# Patient Record
Sex: FEMALE
[De-identification: names, ages, dates, MRNs, and addresses within clinical notes are randomized per-mention and may not be internally consistent; named-entity substitution may affect disease eponyms.]

---

## 2017-04-06 ENCOUNTER — HOSPITAL ENCOUNTER (EMERGENCY)
Dept: HOSPITAL 14 - H.ER | Age: 62
Discharge: HOME | End: 2017-04-06
Payer: MEDICAID

## 2017-04-06 VITALS — DIASTOLIC BLOOD PRESSURE: 95 MMHG | RESPIRATION RATE: 18 BRPM | SYSTOLIC BLOOD PRESSURE: 132 MMHG | HEART RATE: 86 BPM

## 2017-04-06 VITALS — BODY MASS INDEX: 31 KG/M2

## 2017-04-06 VITALS — TEMPERATURE: 97.8 F

## 2017-04-06 DIAGNOSIS — I10: ICD-10-CM

## 2017-04-06 DIAGNOSIS — E10.65: Primary | ICD-10-CM

## 2017-04-06 DIAGNOSIS — Z79.84: ICD-10-CM

## 2017-04-06 DIAGNOSIS — E78.00: ICD-10-CM

## 2017-04-06 LAB
ALBUMIN/GLOB SERPL: 1.2 {RATIO} (ref 1–2.1)
ALP SERPL-CCNC: 158 U/L (ref 38–126)
ALT SERPL-CCNC: 24 U/L (ref 9–52)
AST SERPL-CCNC: 24 U/L (ref 14–36)
BASOPHILS # BLD AUTO: 0 K/UL (ref 0–0.2)
BASOPHILS NFR BLD: 0.6 % (ref 0–2)
BILIRUB SERPL-MCNC: 0.6 MG/DL (ref 0.2–1.3)
BUN SERPL-MCNC: 14 MG/DL (ref 7–17)
CALCIUM SERPL-MCNC: 9.8 MG/DL (ref 8.4–10.2)
CHLORIDE SERPL-SCNC: 98 MMOL/L (ref 98–107)
CO2 SERPL-SCNC: 26 MMOL/L (ref 22–30)
EOSINOPHIL # BLD AUTO: 0.2 K/UL (ref 0–0.7)
EOSINOPHIL NFR BLD: 2.6 % (ref 0–4)
ERYTHROCYTE [DISTWIDTH] IN BLOOD BY AUTOMATED COUNT: 13.2 % (ref 11.5–14.5)
GLOBULIN SER-MCNC: 3.9 GM/DL (ref 2.2–3.9)
GLUCOSE SERPL-MCNC: 319 MG/DL (ref 65–105)
HCT VFR BLD CALC: 39.8 % (ref 34–47)
LYMPHOCYTES # BLD AUTO: 1.8 K/UL (ref 1–4.3)
LYMPHOCYTES NFR BLD AUTO: 29.9 % (ref 20–40)
MCH RBC QN AUTO: 29.1 PG (ref 27–31)
MCHC RBC AUTO-ENTMCNC: 33.5 G/DL (ref 33–37)
MCV RBC AUTO: 86.8 FL (ref 81–99)
MONOCYTES # BLD: 0.3 K/UL (ref 0–0.8)
MONOCYTES NFR BLD: 5.6 % (ref 0–10)
NEUTROPHILS # BLD: 3.7 K/UL (ref 1.8–7)
NEUTROPHILS NFR BLD AUTO: 61.3 % (ref 50–75)
NRBC BLD AUTO-RTO: 0.1 % (ref 0–0)
PLATELET # BLD: 210 K/UL (ref 130–400)
PMV BLD AUTO: 8.9 FL (ref 7.2–11.7)
POTASSIUM SERPL-SCNC: 4.4 MMOL/L (ref 3.6–5)
PROT SERPL-MCNC: 8.6 G/DL (ref 6.3–8.2)
SODIUM SERPL-SCNC: 137 MMOL/L (ref 132–148)
WBC # BLD AUTO: 6 K/UL (ref 4.8–10.8)

## 2017-04-06 NOTE — ED PDOC
Hyperglycemia/Hypoglycemia


Time Seen by Provider: 04/06/17 12:31


Chief Complaint (Nursing): Weakness/Neurological Deficit


Chief Complaint (Provider): Weakness/Neurological Deficit


History Per: Patient


History/Exam Limitations: no limitations


Onset/Duration Of Symptoms: Hrs


Current Symptoms Are (Timing): Still Present


Severity: Mild


Current Diabetic Medications: Insulin


***: The patient does not have any of the infectious symptoms listed except for 

those marked.


Additional Complaint(s): 


Patient is a 61 year old female with a history of DM, presents to ED for 

evaluation of elevated blood sugar today. Patient reports taking her insulin 

twice daily as prescribed but felt dizzy today and took her blood sugar. 

Patient notes that at this time it was over 500. Denies chest pain, nausea, 

vomiting or abdominal pain 








PMD: Dr. Silva





Past Medical History


Reviewed: Historical Data, Nursing Documentation, Vital Signs


Vital Signs: 


 Last Vital Signs











Temp  97.8 F   04/06/17 12:23


 


Pulse  88   04/06/17 12:23


 


Resp  19   04/06/17 12:23


 


BP  129/81   04/06/17 12:23


 


Pulse Ox  100   04/06/17 12:23














- Medical History


PMH: Anemia, Asthma, Depression, Gastritis, HIV, HTN, Hypercholesterolemia, 

Hyperlipidemia


   Denies: Chronic Kidney Disease





- Surgical History


Surgical History: Endoscopy





- Family History


Family History: States: Unknown Family Hx





- Living Arrangements


Living Arrangements: With Family





- Immunization History


Hx Tetanus Toxoid Vaccination: Yes


Hx Influenza Vaccination: Yes


Hx Pneumococcal Vaccination: Yes





- Home Medications


Home Medications: 


 Ambulatory Orders











 Medication  Instructions  Recorded


 


Fluconazole [Diflucan] 100 mg PO DAILY 03/16/16


 


MetFORMIN [glucOPHAGE] 1,000 mg PO BID 03/16/16


 


Multivit, Iron, Min #5, FA 1 each PO DAILY 03/16/16





[Strovite Forte Caplet]  


 


Albuterol HFA [Ventolin HFA 90 2 puff INH Q4H 10/26/16





mcg/actuation (8 g)]  


 


Atorvastatin [Lipitor] 1 tab PO DAILY 10/26/16


 


Emtricita/rilpivir/tenofovir 1 tab PO DAILY 10/26/16





[Complera 200 mg-25 mg-300 mg]  


 


Enalapril Maleate [Vasotec] 1 tab PO DAILY 10/26/16


 


Lurasidone Hydrochloride [Latuda] 1 tab PO DAILY 10/26/16


 


SITagliptin [Januvia] 1 tab PO DAILY 10/26/16














- Allergies


Allergies/Adverse Reactions: 


 Allergies











Allergy/AdvReac Type Severity Reaction Status Date / Time


 


No Known Allergies Allergy   Verified 03/16/16 07:08














Review of Systems


ROS Statement: Except As Marked, All Systems Reviewed And Found Negative


Constitutional: Negative for: Fever, Chills, Weakness


Eyes: Negative for: Vision Change


Cardiovascular: Negative for: Chest Pain, Palpitations


Respiratory: Negative for: Shortness of Breath


Gastrointestinal: Negative for: Nausea, Vomiting, Abdominal Pain


Neurological: Positive for: Dizziness.  Negative for: Weakness, Numbness, 

Headache





Physical Exam





- Reviewed


Nursing Documentation Reviewed: Yes


Vital Signs Reviewed: Yes





- Physical Exam


Appears: Positive for: Non-toxic, No Acute Distress


Skin: Positive for: Normal Color, Warm


Eye Exam: Positive for: Normal appearance


Neck: Positive for: Normal, Painless ROM


Cardiovascular/Chest: Positive for: Regular Rate, Rhythm.  Negative for: Murmur


Respiratory: Positive for: Normal Breath Sounds.  Negative for: Respiratory 

Distress


Gastrointestinal/Abdominal: Positive for: Normal Exam.  Negative for: Tenderness


Back: Positive for: Normal Inspection


Extremity: Positive for: Normal ROM


Neurologic/Psych: Positive for: Alert, Oriented





- Laboratory Results


Result Diagrams: 


 04/06/17 13:03





 04/06/17 13:03





- ECG


O2 Sat by Pulse Oximetry: 100 (RA)


Pulse Ox Interpretation: Normal





Medical Decision Making


Medical Decision Making: 


Time: 1245





Initial impression: Hyperglycemia r/o DKA





Initial plan:


-- CMP


-- Troponin


-- Urine dip


-- CBC


-- NSF








Scribe Attestation:


Documented by Madiha Jiménez acting as a scribe for Junior Sheppard MD MD Scribe Attestation:


All medical record entries made by the Scribe were at my direction and 

personally dictated by me. I have reviewed the chart and agree that the record 

accurately reflects my personal performance of the history, physical exam, 

medical decision making, and the department course for this patient. I have 

also personally directed, reviewed, and agree with the discharge instructions 

and disposition.





Disposition





- Clinical Impression


Clinical Impression: 


 Hyperglycemia due to type 1 diabetes mellitus, Hyperglycemia





- Patient ED Disposition


Is Patient to be Admitted: No


Doctor Will See Patient In The: Office


Counseled Patient/Family Regarding: Studies Performed, Diagnosis





- Disposition


Referrals: 


[facility] [Outside]


Disposition: Routine/Home


Disposition Time: 14:30


Condition: GOOD


Additional Instructions: 


Follow up with your PCP in 2-3 days. 


Instructions:  Diabetic Hyperglycemia (ED)

## 2017-04-07 VITALS — OXYGEN SATURATION: 100 %

## 2017-08-30 ENCOUNTER — HOSPITAL ENCOUNTER (OUTPATIENT)
Dept: HOSPITAL 31 - C.ENDO | Age: 62
Discharge: HOME | End: 2017-08-30
Attending: INTERNAL MEDICINE
Payer: MEDICAID

## 2017-08-30 VITALS — DIASTOLIC BLOOD PRESSURE: 82 MMHG | HEART RATE: 65 BPM | RESPIRATION RATE: 16 BRPM | SYSTOLIC BLOOD PRESSURE: 127 MMHG

## 2017-08-30 VITALS — BODY MASS INDEX: 31.2 KG/M2

## 2017-08-30 VITALS — TEMPERATURE: 98.1 F

## 2017-08-30 VITALS — OXYGEN SATURATION: 100 %

## 2017-08-30 DIAGNOSIS — K21.9: ICD-10-CM

## 2017-08-30 DIAGNOSIS — Z21: ICD-10-CM

## 2017-08-30 DIAGNOSIS — K57.30: ICD-10-CM

## 2017-08-30 DIAGNOSIS — F41.9: ICD-10-CM

## 2017-08-30 DIAGNOSIS — E11.9: ICD-10-CM

## 2017-08-30 DIAGNOSIS — F17.210: ICD-10-CM

## 2017-08-30 DIAGNOSIS — Z79.84: ICD-10-CM

## 2017-08-30 DIAGNOSIS — Z12.11: Primary | ICD-10-CM

## 2017-08-30 DIAGNOSIS — K64.0: ICD-10-CM

## 2017-08-30 DIAGNOSIS — J44.9: ICD-10-CM

## 2017-08-30 PROCEDURE — 45378 DIAGNOSTIC COLONOSCOPY: CPT

## 2017-08-30 PROCEDURE — 82948 REAGENT STRIP/BLOOD GLUCOSE: CPT

## 2017-08-30 NOTE — CP.SDSHP
[facility]





- History


Proposed Procedure: Colonoscopy


Pre-Op Diagnosis: Screening exam





- Previous Medical/Surgical History


Pulmonary: Asthma


Endocrine/Metabolic: Diabetes, Other (yperlipidemia)


Misc: Other (HIV Disease. GERD)


Previous Surgical History: KT





- Allergies


Allergies: 


Allergies





tramadol Allergy (Verified 08/30/17 06:58)


 DIZZINESS











- Current Medications


Current Medications: 





Reviewed, per reconciliation





- Physical Exam


General Appearance: wdwn nad


Vital Signs: 


 Vital Signs











  08/30/17





  07:07


 


Temperature 97.1 F L


 


Pulse Rate 84


 


Respiratory 18





Rate 


 


Blood Pressure 135/100 H


 


O2 Sat by Pulse 100





Oximetry 











Mental Status: Alert & Oriented x3


Neuro: WNL


Heart: WNL


Lungs: WNL


GI: WNL





- {Optional Preform as Required}


Abdomen: WNL





- Impression


Impression: Screening exam for colorectal neoplasia


Pt. Evaluated Today:Candidate for Anesthesia & Procedure: Yes





- Date & Time


Date: 08/30/17


Time: 08:51





Short Stay Discharge





- Short Stay Discharge


Admitting Diagnosis/Reason for Visit: ENCOUNTER FOR SCREENING FOR MALIGNANT 

NEOPLASM OF


Disposition: HOME/ ROUTINE

## 2017-10-18 ENCOUNTER — HOSPITAL ENCOUNTER (OUTPATIENT)
Dept: HOSPITAL 31 - C.ENDO | Age: 62
Discharge: HOME | End: 2017-10-18
Attending: INTERNAL MEDICINE
Payer: MEDICAID

## 2017-10-18 VITALS — TEMPERATURE: 97.7 F

## 2017-10-18 VITALS — HEART RATE: 80 BPM | DIASTOLIC BLOOD PRESSURE: 94 MMHG | SYSTOLIC BLOOD PRESSURE: 136 MMHG

## 2017-10-18 VITALS — OXYGEN SATURATION: 100 %

## 2017-10-18 VITALS — RESPIRATION RATE: 14 BRPM

## 2017-10-18 VITALS — BODY MASS INDEX: 33.6 KG/M2

## 2017-10-18 DIAGNOSIS — K57.90: Primary | ICD-10-CM

## 2017-10-18 PROCEDURE — 82948 REAGENT STRIP/BLOOD GLUCOSE: CPT

## 2017-10-18 PROCEDURE — 45378 DIAGNOSTIC COLONOSCOPY: CPT

## 2017-10-18 NOTE — CP.SDSHP
[facility]





- History


Proposed Procedure: Colonoscopy


Pre-Op Diagnosis: Screening





- Previous Medical/Surgical History


Pulmonary: Asthma


Endocrine/Metabolic: Diabetes


Comments: HIV Disease, GERD


Previous Surgical History: KT





- Allergies


Allergies: 


Allergies





tramadol Allergy (Verified 08/30/17 06:58)


 DIZZINESS











- Current Medications


Current Medications: 





reviewed, per reconciliation





- Physical Exam


General Appearance: wdwn nad


Vital Signs: 


 Vital Signs











  10/18/17





  06:52


 


Temperature 97.3 F L


 


Pulse Rate 80


 


Respiratory 20





Rate 


 


Blood Pressure 138/87


 


O2 Sat by Pulse 100





Oximetry 











Mental Status: Alert & Oriented x3


Heart: WNL


Lungs: WNL


GI: WNL





- {Optional Preform as Required}


Abdomen: WNL





- Impression


Impression: Screening colonoscopy, average risk


Pt. Evaluated Today:Candidate for Anesthesia & Procedure: Yes





- Date & Time


Date: 10/18/17


Time: 08:16





Short Stay Discharge





- Short Stay Discharge


Admitting Diagnosis/Reason for Visit: SCREENING


Disposition: HOME/ ROUTINE


Referrals: 


Lamont Mccall MD [Primary Care Provider] -

## 2018-07-03 ENCOUNTER — HOSPITAL ENCOUNTER (INPATIENT)
Dept: HOSPITAL 14 - H.ER | Age: 63
LOS: 2 days | Discharge: HOME | DRG: 392 | End: 2018-07-05
Attending: FAMILY MEDICINE | Admitting: FAMILY MEDICINE
Payer: MEDICAID

## 2018-07-03 VITALS — BODY MASS INDEX: 31.7 KG/M2

## 2018-07-03 DIAGNOSIS — E11.40: ICD-10-CM

## 2018-07-03 DIAGNOSIS — Z21: ICD-10-CM

## 2018-07-03 DIAGNOSIS — E78.00: ICD-10-CM

## 2018-07-03 DIAGNOSIS — K29.80: Primary | ICD-10-CM

## 2018-07-03 DIAGNOSIS — F32.9: ICD-10-CM

## 2018-07-03 DIAGNOSIS — I10: ICD-10-CM

## 2018-07-03 DIAGNOSIS — F17.210: ICD-10-CM

## 2018-07-03 DIAGNOSIS — K59.09: ICD-10-CM

## 2018-07-03 DIAGNOSIS — Z79.4: ICD-10-CM

## 2018-07-03 DIAGNOSIS — J45.909: ICD-10-CM

## 2018-07-03 DIAGNOSIS — Z88.6: ICD-10-CM

## 2018-07-03 DIAGNOSIS — E78.5: ICD-10-CM

## 2018-07-03 DIAGNOSIS — K29.70: ICD-10-CM

## 2018-07-03 LAB
ALBUMIN SERPL-MCNC: 4.4 G/DL (ref 3.5–5)
ALBUMIN/GLOB SERPL: 1.1 {RATIO} (ref 1–2.1)
ALT SERPL-CCNC: 27 U/L (ref 9–52)
APTT BLD: 32.2 SECONDS (ref 25.6–37.1)
AST SERPL-CCNC: 23 U/L (ref 14–36)
BASOPHILS # BLD AUTO: 0 K/UL (ref 0–0.2)
BASOPHILS NFR BLD: 0.9 % (ref 0–2)
BILIRUB UR-MCNC: NEGATIVE MG/DL
BUN SERPL-MCNC: 11 MG/DL (ref 7–17)
CALCIUM SERPL-MCNC: 9.7 MG/DL (ref 8.4–10.2)
COLOR UR: YELLOW
EOSINOPHIL # BLD AUTO: 0.2 K/UL (ref 0–0.7)
EOSINOPHIL NFR BLD: 3.6 % (ref 0–4)
ERYTHROCYTE [DISTWIDTH] IN BLOOD BY AUTOMATED COUNT: 14 % (ref 11.5–14.5)
GFR NON-AFRICAN AMERICAN: > 60
GLUCOSE UR STRIP-MCNC: >=500 MG/DL
HGB BLD-MCNC: 13 G/DL (ref 12–16)
INR PPP: 1 (ref 0.9–1.2)
LEUKOCYTE ESTERASE UR-ACNC: (no result) LEU/UL
LYMPHOCYTES # BLD AUTO: 1.7 K/UL (ref 1–4.3)
LYMPHOCYTES NFR BLD AUTO: 32.1 % (ref 20–40)
MCH RBC QN AUTO: 29.5 PG (ref 27–31)
MCHC RBC AUTO-ENTMCNC: 33.9 G/DL (ref 33–37)
MCV RBC AUTO: 87 FL (ref 81–99)
MONOCYTES # BLD: 0.3 K/UL (ref 0–0.8)
MONOCYTES NFR BLD: 5.8 % (ref 0–10)
NEUTROPHILS # BLD: 3 K/UL (ref 1.8–7)
NEUTROPHILS NFR BLD AUTO: 57.6 % (ref 50–75)
NRBC BLD AUTO-RTO: 0.1 % (ref 0–0)
PH UR STRIP: 6 [PH] (ref 5–8)
PLATELET # BLD: 282 K/UL (ref 130–400)
PMV BLD AUTO: 7.5 FL (ref 7.2–11.7)
PROT UR STRIP-MCNC: 30 MG/DL
PROTHROMBIN TIME: 10.9 SECONDS (ref 9.8–13.1)
RBC # BLD AUTO: 4.42 MIL/UL (ref 3.8–5.2)
RBC # UR STRIP: NEGATIVE /UL
SP GR UR STRIP: 1.03 (ref 1–1.03)
SQUAMOUS EPITHIAL: 4 /HPF (ref 0–5)
URINE CLARITY: (no result)
URINE HYALINE CAST: (no result) /HPF (ref 0–2)
UROBILINOGEN UR-MCNC: 2 MG/DL (ref 0.2–1)
WBC # BLD AUTO: 5.2 K/UL (ref 4.8–10.8)

## 2018-07-03 RX ADMIN — INSULIN LISPRO SCH: 100 INJECTION, SOLUTION INTRAVENOUS; SUBCUTANEOUS at 22:36

## 2018-07-03 RX ADMIN — ALBUTEROL SULFATE SCH PUFF: 90 AEROSOL, METERED RESPIRATORY (INHALATION) at 20:13

## 2018-07-03 RX ADMIN — WATER SCH MLS/HR: 1 INJECTION INTRAMUSCULAR; INTRAVENOUS; SUBCUTANEOUS at 15:26

## 2018-07-03 RX ADMIN — ALBUTEROL SULFATE SCH PUFF: 90 AEROSOL, METERED RESPIRATORY (INHALATION) at 23:34

## 2018-07-03 RX ADMIN — WATER SCH MLS/HR: 1 INJECTION INTRAMUSCULAR; INTRAVENOUS; SUBCUTANEOUS at 21:04

## 2018-07-03 NOTE — CP.PCM.HP
History of Present Illness





- History of Present Illness


History of Present Illness: 





"ayanna been constipated all week and last night my stomach really hurt"





64 y/o female, with PMHx remarkable for HIV, HTN, IDDM2, Asthma, presented for 

evaluation of acute worsening of abominal pain. Pt reports pain started 5 days 

ago when she became constipated. She attempted to take 2 tablets of Dulcolax 

but they did not help her have a bowel movement. Pain gradually worsened the 

past 5 days without any noticable alleviating factors. Pain was 5/10, "bloating 

like" in character, nonradiating, exacerbated with movement or pressure. Last 

night pain acutely worsened without triggering factor, prompting pt to come to 

ED for evaluation. She reports she has associated nausea with loss of appetite. 

She had 1 episode of vomiting yesterday that was NBNB. Denies fever/chills, CP/

SOB/Palpitations, diarrhea, urinary symptoms.





ROS: 12 points reviewed, as per HPI





PMD: Royal Goff


PMHx: HIV, IDDM2, HTN, HLD, Asthma (unspecified severity)


Meds: as per med rec


ALL: Tramadol (pruritus)


PsurgHx: hysterecomty (2011) due to fibroids


FamilyHx: denies family hx of CA, MI, DM


Social Hx: 4 ciggarettes per day since age 11, denies ETOH/drug abuse


Next of Kin: as per demographics (confirmed)


Code Status: full code





ED course:


Vitals: tachycardia 118, otherwise stable


CBC: wnl


CMP: elevated glucose


Coags: wnl


UA: mod LE, mod RBCs


Imaging


Abd pelvis/CT w contrast: dual thickening at the 1st/2nd segments the duodenum 

is suspected with definite berry duodenal reactive changes but no free air 

abscess or ascites. Reactive changes appear to involve the neck of the 

gallbladder. This is felt to be sympathetic rather than intrinsic to 

gallbladder and the finding suspicious for prominent duodenitis or even 

potential microperforation ulcer.  


Tx


npo, pain control, IV fluids, GI and Surg Consults  





Present on Admission





- Present on Admission


Any Indicators Present on Admission: No


History of DVT/PE: No


History of Uncontrolled Diabetes: No


Urinary Catheter: No


Decubitus Ulcer Present: No





Review of Systems





- Review of Systems


All systems: reviewed and no additional remarkable complaints except





Past Patient History





- Infectious Disease


Hx of Infectious Diseases: None





- Past Medical History & Family History


Past Medical History?: Yes


Past Family History: Reviewed and not pertinent





- Past Social History


Smoking Status: Light Smoker < 10 Cigarettes Daily


Alcohol: None


Drugs: Denies


Home Situation {Lives}: Alone





- CARDIAC


Hx Hypercholesterolemia: Yes


Hx Hypertension: Yes





- PULMONARY


Hx Asthma: Yes





- NEUROLOGICAL


Hx Neurological Disorder: Yes


Other/Comment: Neuropathy





- HEENT


Hx HEENT Problems: Yes


Other/Comment: OROPHARYNGEAL CANDIDIASIS.  Blurry vision





- RENAL


Hx Chronic Kidney Disease: No





- ENDOCRINE/METABOLIC


Hx Endocrine Disorders: Yes


Hx Diabetes Mellitus Type 1: Yes


Hx Diabetes Mellitus Type 2: Yes





- HEMATOLOGICAL/ONCOLOGICAL


Hx Anemia: Yes


Hx Human Immunodeficiency Virus (HIV): Yes





- INTEGUMENTARY


Hx Dermatological Problems: No





- MUSCULOSKELETAL/RHEUMATOLOGICAL


Hx Musculoskeletal Disorders: Yes


Other/Comment: LT KNEE PAIN





- GASTROINTESTINAL


Hx Gastritis: Yes





- GENITOURINARY/GYNECOLOGICAL


Hx Genitourinary Disorders: No





- PSYCHIATRIC


Hx Depression: Yes





- SURGICAL HISTORY


Hx Surgeries: Yes


Hx Hysterectomy: Yes


Other/Comment: RT PAROTID MASS NEEDLE ASPIRATION





- ANESTHESIA


Hx Anesthesia: Yes


Hx Anesthesia Reactions: No


Hx Malignant Hyperthermia: No





Meds


Allergies/Adverse Reactions: 


 Allergies











Allergy/AdvReac Type Severity Reaction Status Date / Time


 


tramadol Allergy  DIZZINESS Verified 07/03/18 08:55














Physical Exam





- Constitutional


Appears: Well, Non-toxic, No Acute Distress





- Head Exam


Head Exam: ATRAUMATIC, NORMAL INSPECTION, NORMOCEPHALIC





- Eye Exam


Eye Exam: EOMI, Normal appearance, PERRL.  absent: Conjunctival injection, 

Scleral icterus


Pupil Exam: NORMAL ACCOMODATION, PERRL





- ENT Exam


ENT Exam: Mucous Membranes Moist, Normal Exam





- Neck Exam


Neck exam: Positive for: Normal Inspection.  Negative for: Lymphadenopathy





- Respiratory Exam


Respiratory Exam: Clear to Auscultation Bilateral, NORMAL BREATHING PATTERN.  

absent: Accessory Muscle Use, Decreased Breath Sounds, Rales, Rhonchi, Wheezes, 

Respiratory Distress





- Cardiovascular Exam


Cardiovascular Exam: REGULAR RHYTHM, RRR, +S1, +S2.  absent: Tachycardia, JVD, 

Systolic Murmur





- GI/Abdominal Exam


GI & Abdominal Exam: Distended (tympanic to percussion diffusely ), Normal 

Bowel Sounds, Soft, Tenderness.  absent: Diminished Bowel Sounds, Guarding, Mass

, Pulsatile Mass, Rebound, Rigid





- Extremities Exam


Extremities exam: Positive for: calf tenderness, normal capillary refill, 

normal inspection, pedal pulses present.  Negative for: pedal edema





- Back Exam


Back exam: absent: CVA tenderness (L), CVA tenderness (R), tenderness





- Neurological Exam


Neurological exam: Alert, CN II-XII Intact, Normal Gait, Oriented x3, Reflexes 

Normal





- Psychiatric Exam


Psychiatric exam: Normal Affect, Normal Mood





- Skin


Skin Exam: Dry, Intact, Warm





Results





- Vital Signs


Recent Vital Signs: 





 Last Vital Signs











Temp  97.8 F   07/03/18 08:48


 


Pulse  113 H  07/03/18 08:48


 


Resp  20   07/03/18 08:48


 


BP  108/78   07/03/18 08:48


 


Pulse Ox  96   07/03/18 14:19














- Labs


Result Diagrams: 


 07/03/18 09:59





 07/03/18 10:17


Labs: 





 Laboratory Results - last 24 hr











  07/03/18 07/03/18 07/03/18





  09:59 10:17 10:17


 


WBC  5.2  


 


RBC  4.42  


 


Hgb  13.0  


 


Hct  38.4  


 


MCV  87.0  


 


MCH  29.5  


 


MCHC  33.9  


 


RDW  14.0  


 


Plt Count  282  


 


MPV  7.5  


 


Neut % (Auto)  57.6  


 


Lymph % (Auto)  32.1  


 


Mono % (Auto)  5.8  


 


Eos % (Auto)  3.6  


 


Baso % (Auto)  0.9  


 


Neut # (Auto)  3.0  


 


Lymph # (Auto)  1.7  


 


Mono # (Auto)  0.3  


 


Eos # (Auto)  0.2  


 


Baso # (Auto)  0.0  


 


PT    10.9


 


INR    1.0


 


APTT    32.2


 


Sodium   139 


 


Potassium   4.1 


 


Chloride   102 


 


Carbon Dioxide   27 


 


Anion Gap   14 


 


BUN   11 


 


Creatinine   0.7 


 


Est GFR ( Amer)   > 60 


 


Est GFR (Non-Af Amer)   > 60 


 


Random Glucose   223 H 


 


Calcium   9.7 


 


Total Bilirubin   0.4 


 


AST   23 


 


ALT   27 


 


Alkaline Phosphatase   203 H 


 


Total Protein   8.3 H 


 


Albumin   4.4 


 


Globulin   3.9 


 


Albumin/Globulin Ratio   1.1 


 


Urine Color   


 


Urine Clarity   


 


Urine pH   


 


Ur Specific Gravity   


 


Urine Protein   


 


Urine Glucose (UA)   


 


Urine Ketones   


 


Urine Blood   


 


Urine Nitrate   


 


Urine Bilirubin   


 


Urine Urobilinogen   


 


Ur Leukocyte Esterase   


 


Urine RBC (Auto)   


 


Urine Microscopic WBC   


 


Ur Squamous Epith Cells   


 


Hyaline Casts   














  07/03/18





  10:21


 


WBC 


 


RBC 


 


Hgb 


 


Hct 


 


MCV 


 


MCH 


 


MCHC 


 


RDW 


 


Plt Count 


 


MPV 


 


Neut % (Auto) 


 


Lymph % (Auto) 


 


Mono % (Auto) 


 


Eos % (Auto) 


 


Baso % (Auto) 


 


Neut # (Auto) 


 


Lymph # (Auto) 


 


Mono # (Auto) 


 


Eos # (Auto) 


 


Baso # (Auto) 


 


PT 


 


INR 


 


APTT 


 


Sodium 


 


Potassium 


 


Chloride 


 


Carbon Dioxide 


 


Anion Gap 


 


BUN 


 


Creatinine 


 


Est GFR ( Amer) 


 


Est GFR (Non-Af Amer) 


 


Random Glucose 


 


Calcium 


 


Total Bilirubin 


 


AST 


 


ALT 


 


Alkaline Phosphatase 


 


Total Protein 


 


Albumin 


 


Globulin 


 


Albumin/Globulin Ratio 


 


Urine Color  Yellow


 


Urine Clarity  Slighty-cloudy


 


Urine pH  6.0


 


Ur Specific Gravity  1.026


 


Urine Protein  30


 


Urine Glucose (UA)  >=500


 


Urine Ketones  Negative


 


Urine Blood  Negative


 


Urine Nitrate  Negative


 


Urine Bilirubin  Negative


 


Urine Urobilinogen  2.0 H


 


Ur Leukocyte Esterase  Mod


 


Urine RBC (Auto)  10 H


 


Urine Microscopic WBC  3


 


Ur Squamous Epith Cells  4


 


Hyaline Casts  0-2














Assessment & Plan





- Assessment and Plan (Free Text)


Assessment: 





64 y/o female admitted for worsening abdominal pain and possible gastric 

microperfortation. 


Plan: 





1) Abdominal Pain/Constipation/Suspected Microperf


-NPO


-CT scan as described above


-seen by GI, follow up further recommnedations 


-Protonix BID


-Fleet enema


-pain control as ordered


-zofran PRN nausea


-IV fluid hydration, NS 200mls/hr


-Gen surg consult pending





2) HIV


-stable


-VL undetectable


-CD4 >600 


-c/w current medications





3) IDDM2


-c/w nighttime levemir dose


-lispro coverage scale


-hypoglycemia protocol





4) Asthma (unspecified severity)


-stable


-c/w home meds as ordered


-albuterol prn SOB





5) DVT PPx


-ambulation


-SCDs





6) Code Status


-full code

## 2018-07-03 NOTE — CT
PROCEDURE:  CT Abdomen and Pelvis with contrast



HISTORY:

Abd pain, constipation X 1 week



COMPARISON:

Noncontrast abdomen and pelvis CT 06/08/2018.



TECHNIQUE:

Contrast dose: Omnipaque 300, 95 cc



Radiation dose:



Total exam DLP = 890.05 mGy-cm.



This CT exam was performed using one or more of the following dose 

reduction techniques: Automated exposure control, adjustment of the 

mA and/or kV according to patient size, and/or use of iterative 

reconstruction technique.



FINDINGS:



LOWER THORAX:

Unremarkable. 



LIVER:

Hepatic steatosis reiterated with liver appearing stable. 



GALLBLADDER AND BILE DUCTS:

Gallbladder is again seen distended. Local reactive changes relative 

to the 1st and 2nd segments the duodenum are favored over possible 

gallbladder neck origin. No radiodense cholelithiasis is seen once 

again. 



PANCREAS:

Unremarkable. No gross lesion or ductal dilatation.



SPLEEN:

Unremarkable. 



ADRENALS:

Unremarkable. No mass. 



KIDNEYS AND URETERS:

Unremarkable. No hydronephrosis. No solid mass. 



VASCULATURE:

Unremarkable. No aortic aneurysm. 



BOWEL:

Stomach is collapsed.  Thickening of the proximal duodenum is 

suggested with local.  Duodenal reaction suspicious for possible 

advanced duodenitis or even ulcer.  Microperforation not completely 

excluded. No free intra peritoneal gas however. No ascites.  

Clinically correlate further.



The bowel is not appear obstructed appear prominent fecal loading is 

seen at the ascending through hepatic flexure segments suspicious for 

developing constipation although the distal large bowel appears 

largely collapsed.  Occasional diverticula are associated with the 

left hemicolon however there is no diverticulitis. 



APPENDIX:

Normal appendix. 



PERITONEUM:

Unremarkable. No free fluid. No free air. 



LYMPH NODES:

Unremarkable. No enlarged lymph nodes. 



BLADDER:

Unremarkable. 



REPRODUCTIVE:

Prior hysterectomy reiterated. 



BONES:

No acute fracture. 



OTHER FINDINGS:

None.



IMPRESSION:

1. Dural thickening at the 1st and 2nd segments the duodenum is 

suspected with definite berry duodenal reactive change but no free 

air abscess or ascites. As this reactive changes appears to involve 

the neck of the gallbladder, this is felt to be sympathetic rather 

than intrinsic to the gallbladder and the findings suspicious for 

prominent duodenitis or even potential micro perforated ulcer GI 

consultation recommended as clinically warranted. 



2. Hepatic steatosis. 



3. Nonacute scattered colonic diverticular changes again evident. 



4. Prior hysterectomy.

## 2018-07-03 NOTE — ED PDOC
HPI: Abdomen


Time Seen by Provider: 07/03/18 09:06


Chief Complaint (Nursing): Abdominal Pain


Chief Complaint (Provider): abdominal pain and constipation 


History Per: Patient


History/Exam Limitations: no limitations


Onset/Duration Of Symptoms: Days (4x)


Current Symptoms Are (Timing): Still Present


Quality Of Discomfort: "Pain"


Associated Symptoms: Vomiting, Constipation.  denies: Nausea, Diarrhea


Additional Complaint(s): 


63 year old female presents to the ED complaining of constant abdominal pain 

and constipation onset for 4 days. States she usually has bowel movement every 

3 days. Patient states she had a hysterectomy. Reports she vomited yesterday 

but not today. Denies nausea or diarrhea. 


PMD: Royal Goff








Past Medical History


Reviewed: Historical Data, Nursing Documentation, Vital Signs


Vital Signs: 


 Last Vital Signs











Temp  98.1 F   07/05/18 09:00


 


Pulse  90   07/05/18 09:00


 


Resp  19   07/05/18 09:00


 


BP  141/96 H  07/05/18 09:00


 


Pulse Ox  100   07/05/18 09:00














- Medical History


PMH: Anemia, Asthma, Depression, Gastritis, HIV, HTN, Hypercholesterolemia, 

Hyperlipidemia


   Denies: Chronic Kidney Disease





- Surgical History


Surgical History: Endoscopy





- Family History


Family History: States: Unknown Family Hx





- Immunization History


Hx Tetanus Toxoid Vaccination: Yes


Hx Influenza Vaccination: Yes


Hx Pneumococcal Vaccination: Yes





- Home Medications


Home Medications: 


 Ambulatory Orders











 Medication  Instructions  Recorded


 


MetFORMIN [glucOPHAGE] 1,000 mg PO BID 03/16/16


 


Enalapril Maleate [Vasotec] 10 mg PO DAILY 10/26/16


 


Dolutegravir Sodium [Tivicay] 50 mg PO DAILY 08/30/17


 


Omeprazole 40 mg PO DAILY 08/30/17


 


Emtricitabine/Tenofovir Diso 1 tab PO DAILY 10/18/17





[Truvada 200 MG-300 MG]  


 


Albuterol/Ipratropium [Combivent 1 puff IH Q6 PRN 07/03/18





Respimat]  


 


Amitriptyline [Elavil] 100 mg PO HS 07/03/18


 


Aripiprazole [Abilify] 30 mg PO DAILY 07/03/18


 


Gabapentin [Neurontin] 300 mg PO Q12 07/03/18


 


Insulin Glargine,Hum.rec.anlog 50 unit SC BID 07/03/18





[Basaglar Kwikpen U-100]  


 


buPROPion [Wellbutrin] 75 mg PO DAILY 07/03/18














- Allergies


Allergies/Adverse Reactions: 


 Allergies











Allergy/AdvReac Type Severity Reaction Status Date / Time


 


tramadol Allergy  DIZZINESS Verified 07/03/18 08:55














Review of Systems


ROS Statement: Except As Marked, All Systems Reviewed And Found Negative


Gastrointestinal: Positive for: Abdominal Pain, Constipation.  Negative for: 

Nausea, Vomiting, Diarrhea





Physical Exam





- Reviewed


Nursing Documentation Reviewed: Yes


Vital Signs Reviewed: Yes





- Physical Exam


Appears: Positive for: Non-toxic, No Acute Distress


Head Exam: Positive for: ATRAUMATIC, NORMAL INSPECTION, NORMOCEPHALIC


Skin: Positive for: Normal Color, Warm, Dry


Eye Exam: Positive for: Normal appearance


ENT: Positive for: Normal ENT Inspection


Neck: Positive for: Normal, Painless ROM, Supple.  Negative for: Decreased ROM


Cardiovascular/Chest: Positive for: Regular Rate, Rhythm.  Negative for: Murmur


Respiratory: Positive for: Normal Breath Sounds.  Negative for: Decreased 

Breath Sounds, Wheezing, Respiratory Distress


Gastrointestinal/Abdominal: Positive for: Soft, Tenderness (mild generalized).  

Negative for: Guarding, Rebound


Back: Positive for: Normal Inspection.  Negative for: L CVA Tenderness, R CVA 

Tenderness


Extremity: Positive for: Normal ROM.  Negative for: Tenderness, Pedal Edema, 

Deformity


Neurologic/Psych: Positive for: Alert, Oriented (x3).  Negative for: Motor/

Sensory Deficits





- Laboratory Results


Result Diagrams: 


 07/05/18 05:30





 07/05/18 05:30





- ECG


O2 Sat by Pulse Oximetry: 96 (RA)


Pulse Ox Interpretation: Normal





- Physician Consult Information


Physician Contacted: Meseret Gonzalez





Medical Decision Making


Medical Decision Making: 


Time: 0950


Initial Impression: abdominal pain and constipation  


Initial Plan:


--Abd & Pelvis IV Contrast CT


--EKG


--CMP


--ED Urine dipstick


--CBC w/ Differential 


--PTT


--Prothrombin Time


--Morphine 2mg IV 


--Urinalysis  


--Reevaluation 





Time: 1250


PROCEDURE:  CT Abdomen and Pelvis with contrast


HISTORY:


Abd pain, constipation X 1 week


COMPARISON:


Noncontrast abdomen and pelvis CT 06/08/2018.


TECHNIQUE:


Contrast dose: Omnipaque 300, 95 cc


Radiation dose:


Total exam DLP = 890.05 mGy-cm.


This CT exam was performed using one or more of the following dose reduction 

techniques: Automated exposure control, adjustment of the mA and/or kV 

according to patient size, and/or use of iterative reconstruction technique.


FINDINGS:


LOWER THORAX:


Unremarkable. 


LIVER:


Hepatic steatosis reiterated with liver appearing stable. 


GALLBLADDER AND BILE DUCTS:


Gallbladder is again seen distended. Local reactive changes relative to the 1st 

and 2nd segments the duodenum are favored over possible gallbladder neck 

origin. No radiodense cholelithiasis is seen once again. 


PANCREAS:


Unremarkable. No gross lesion or ductal dilatation.


SPLEEN:


Unremarkable. 


ADRENALS:


Unremarkable. No mass. 


KIDNEYS AND URETERS:


Unremarkable. No hydronephrosis. No solid mass. 


VASCULATURE:


Unremarkable. No aortic aneurysm. 


BOWEL:


Stomach is collapsed.  Thickening of the proximal duodenum is suggested with 

local.  Duodenal reaction suspicious for possible advanced duodenitis or even 

ulcer.  Microperforation not completely excluded. No free intra peritoneal gas 

however. No ascites.  Clinically correlate further.


The bowel is not appear obstructed appear prominent fecal loading is seen at 

the ascending through hepatic flexure segments suspicious for developing 

constipation although the distal large bowel appears largely collapsed.  

Occasional diverticula are associated with the left hemicolon however there is 

no diverticulitis. 


APPENDIX:


Normal appendix. 


PERITONEUM:


Unremarkable. No free fluid. No free air. 


LYMPH NODES:


Unremarkable. No enlarged lymph nodes. 


BLADDER:


Unremarkable. 


REPRODUCTIVE:


Prior hysterectomy reiterated. 


BONES:


No acute fracture. 


OTHER FINDINGS:


None.


IMPRESSION:


1. Dural thickening at the 1st and 2nd segments the duodenum is suspected with 

definite berry duodenal reactive change but no free air abscess or ascites. As 

this reactive changes appears to involve the neck of the gallbladder, this is 

felt to be sympathetic rather than intrinsic to the gallbladder and the 

findings suspicious for prominent duodenitis or even potential micro perforated 

ulcer GI consultation recommended as clinically warranted. 


2. Hepatic steatosis. 


3. Nonacute scattered colonic diverticular changes again evident. 


4. Prior hysterectomy.





Time: 1409


Called surgical resident


--------------------------------------------------------------------------------

-----------------


Scribe Attestation:


Documented by Garret Brower, acting as a scribe for Amna Jose MD





Provider Scribe Attestation:


All medical record entries made by the Scribe were at my direction and 

personally dictated by me. I have reviewed the chart and agree that the record 

accurately reflects my personal performance of the history, physical exam, 

medical decision making, and the department course for this patient. I have 

also personally directed, reviewed, and agree with the discharge instructions 

and disposition.








Disposition





- Clinical Impression


Clinical Impression: 


 Duodenitis








- Disposition


Disposition Time: 15:00


Condition: STABLE





- Pt Status Changed To:


Hospital Disposition Of: Inpatient





- Admit Certification


Admit to Inpatient:: After my assessment, the patient will require 

hospitalization for at least two midnights.  This is because of the severity of 

symptoms shown, intensity of services needed, and/or the medical risk in this 

patient being treated as an outpatient.





- POA


Present On Arrival: None

## 2018-07-03 NOTE — CARD
--------------- APPROVED REPORT --------------





EKG Measurement

Heart [id number]

[id number]

[id number]

[id number]

[id number]



<Conclusion>

Normal sinus rhythm

Normal ECG

## 2018-07-03 NOTE — CP.PCM.CON
History of Present Illness





- History of Present Illness


History of Present Illness: 





Surgery 





63 F w POMH of HIV, DM , gastritis and hysterectomy came with abd pain and 

constipation for 1 week. Pain is located on epigatric area and now more 

diffuse. ALso feeling bloating. Reports episode of vomiting. Non bloody non 

bilious. She had soup yesterday and vomited food content after having soup. 

Ducolax didn;t relieve sx. Pain is gradualy worsen. Currently pt is hungry. 

Denies fever, CP , SOB, hematuria, dysuria, hematemesis, hemoptesis, 

hematochezia, melena. Pt reports she had outpatient EGD 1 month ago. Doesn't 

know the result. Reports taking omerprazole BID. CT reads dudenitis and can not 

rule out possible microperf. Sx is consulted to evaluate for possible Duodenal 

perforation. 





PMHx: HIV, IDDM2, HTN, HLD, Asthma


ALL: Tramadol (pruritus)


PsurgHx: hysterecomty (2011) due to fibroids


FamilyHx: denies family hx of CA, MI, DM


Social Hx: 4 ciggarettes per day since age 11, denies ETOH/drug abuse





Review of Systems





- Review of Systems


Review of Systems: 





See HPI 





Past Patient History





- Infectious Disease


Hx of Infectious Diseases: None





- Past Medical History & Family History


Past Medical History?: Yes


Past Family History: Reviewed and not pertinent





- Past Social History


Smoking Status: Light Smoker < 10 Cigarettes Daily


Alcohol: None


Drugs: Denies


Home Situation {Lives}: Alone





- CARDIAC


Hx Hypercholesterolemia: Yes


Hx Hypertension: Yes





- PULMONARY


Hx Asthma: Yes





- NEUROLOGICAL


Hx Neurological Disorder: Yes


Other/Comment: Neuropathy





- HEENT


Hx HEENT Problems: Yes


Other/Comment: OROPHARYNGEAL CANDIDIASIS.  Blurry vision





- RENAL


Hx Chronic Kidney Disease: No





- ENDOCRINE/METABOLIC


Hx Endocrine Disorders: Yes


Hx Diabetes Mellitus Type 1: Yes


Hx Diabetes Mellitus Type 2: Yes





- HEMATOLOGICAL/ONCOLOGICAL


Hx Anemia: Yes


Hx Human Immunodeficiency Virus (HIV): Yes





- INTEGUMENTARY


Hx Dermatological Problems: No





- MUSCULOSKELETAL/RHEUMATOLOGICAL


Hx Musculoskeletal Disorders: Yes


Other/Comment: LT KNEE PAIN





- GASTROINTESTINAL


Hx Gastritis: Yes





- GENITOURINARY/GYNECOLOGICAL


Hx Genitourinary Disorders: No





- PSYCHIATRIC


Hx Depression: Yes





- SURGICAL HISTORY


Hx Surgeries: Yes


Hx Hysterectomy: Yes


Other/Comment: RT PAROTID MASS NEEDLE ASPIRATION





- ANESTHESIA


Hx Anesthesia: Yes


Hx Anesthesia Reactions: No


Hx Malignant Hyperthermia: No





Meds


Allergies/Adverse Reactions: 


 Allergies











Allergy/AdvReac Type Severity Reaction Status Date / Time


 


tramadol Allergy  DIZZINESS Verified 07/03/18 08:55














- Medications


Medications: 


 Current Medications





Albuterol (Ventolin Hfa 90 Mcg/Actuation (8 G))  2 puff INH Q4H [facility]


Dextrose (Dextrose 50% Inj)  0 ml IV STAT PRN; Protocol


   PRN Reason: Hypoglycemia Protocol


Dextrose (Glutose 15)  0 gm PO ONCE PRN; Protocol


   PRN Reason: Hypoglycemia Protocol


Dolutegravir Sodium (Tivicay)  50 mg PO DAILY [facility]


   PRN Reason: Protocol


Duloxetine HCl (Cymbalta)  60 mg PO DAILY [facility]


Emtricitabine/Tenofovir (Truvada 200 Mg-300 Mg)  1 tab PO DAILY [facility]


   PRN Reason: Protocol


Enalapril Maleate (Vasotec)  10 mg PO DAILY [facility]


Glucagon (Glucagen Diagnostic Kit)  0 mg IM STAT PRN; Protocol


   PRN Reason: Hypoglycemia Protocol


Home Med (Bupropion Xl [Wellbutrin Xl])  1 tab PO DAILY [facility]


Piperacillin Sod/Tazobactam (Sod 3.375 gm/ Sodium Chloride)  100 mls @ 100 mls/

hr IVPB Q6 [facility]


   PRN Reason: Protocol


   Last Admin: 07/03/18 15:26 Dose:  100 mls/hr


Insulin Detemir (Levemir)  30 units SC QAM [facility]


Insulin Human Lispro (Humalog)  0 units SC ACHS [facility]


   PRN Reason: Protocol


Ketorolac Tromethamine (Toradol)  15 mg IVP Q6 PRN


   PRN Reason: Pain, moderate (4-7)


Morphine Sulfate (Morphine)  2 mg IVP Q4 PRN


   PRN Reason: Pain, severe (8-10)


Ondansetron HCl (Zofran Inj)  4 mg IVP Q6 PRN


   PRN Reason: Nausea/Vomiting


Pantoprazole Sodium (Protonix Inj)  40 mg IVP BID [facility]


   Last Admin: 07/03/18 15:30 Dose:  40 mg


Sodium Phosphate (Fleet Enema)  135 ml MD ONCE ONE


   Stop: 07/03/18 18:01











Physical Exam





- Constitutional


Appears: No Acute Distress





- Head Exam


Head Exam: ATRAUMATIC, NORMAL INSPECTION, NORMOCEPHALIC





- Eye Exam


Eye Exam: EOMI, Normal appearance, PERRL


Pupil Exam: NORMAL ACCOMODATION, PERRL





- ENT Exam


ENT Exam: Mucous Membranes Moist, Normal Exam





- Neck Exam


Neck exam: Positive for: Normal Inspection





- Respiratory Exam


Respiratory Exam: NORMAL BREATHING PATTERN





- Cardiovascular Exam


Cardiovascular Exam: Tachycardia, +S1, +S2





- GI/Abdominal Exam


GI & Abdominal Exam: Guarding, Soft, Tenderness.  absent: Distended, Firm, 

Hernia, Organomegaly, Pulsatile Mass, Rebound, Rigid


Additional comments: 





Obese abd. Low abd TTP. 





-  Exam


 Exam: NORMAL INSPECTION





- Extremities Exam


Extremities exam: Positive for: full ROM, normal inspection





- Back Exam


Back exam: NORMAL INSPECTION





- Neurological Exam


Neurological exam: Alert, CN II-XII Intact, Normal Gait, Oriented x3, Reflexes 

Normal





- Psychiatric Exam


Psychiatric exam: Normal Affect, Normal Mood





- Skin


Skin Exam: Dry, Intact, Normal Color, Warm





Results





- Vital Signs


Recent Vital Signs: 


 Last Vital Signs











Temp  97.7 F   07/03/18 15:44


 


Pulse  78   07/03/18 15:44


 


Resp  18   07/03/18 15:44


 


BP  144/90   07/03/18 15:44


 


Pulse Ox  97   07/03/18 15:44














- Labs


Result Diagrams: 


 07/03/18 09:59





 07/03/18 10:17


Labs: 


 Laboratory Results - last 24 hr











  07/03/18 07/03/18 07/03/18





  09:59 10:17 10:17


 


WBC  5.2  


 


RBC  4.42  


 


Hgb  13.0  


 


Hct  38.4  


 


MCV  87.0  


 


MCH  29.5  


 


MCHC  33.9  


 


RDW  14.0  


 


Plt Count  282  


 


MPV  7.5  


 


Neut % (Auto)  57.6  


 


Lymph % (Auto)  32.1  


 


Mono % (Auto)  5.8  


 


Eos % (Auto)  3.6  


 


Baso % (Auto)  0.9  


 


Neut # (Auto)  3.0  


 


Lymph # (Auto)  1.7  


 


Mono # (Auto)  0.3  


 


Eos # (Auto)  0.2  


 


Baso # (Auto)  0.0  


 


PT    10.9


 


INR    1.0


 


APTT    32.2


 


Sodium   139 


 


Potassium   4.1 


 


Chloride   102 


 


Carbon Dioxide   27 


 


Anion Gap   14 


 


BUN   11 


 


Creatinine   0.7 


 


Est GFR ( Amer)   > 60 


 


Est GFR (Non-Af Amer)   > 60 


 


POC Glucose (mg/dL)   


 


Random Glucose   223 H 


 


Calcium   9.7 


 


Total Bilirubin   0.4 


 


AST   23 


 


ALT   27 


 


Alkaline Phosphatase   203 H 


 


Total Protein   8.3 H 


 


Albumin   4.4 


 


Globulin   3.9 


 


Albumin/Globulin Ratio   1.1 


 


Urine Color   


 


Urine Clarity   


 


Urine pH   


 


Ur Specific Gravity   


 


Urine Protein   


 


Urine Glucose (UA)   


 


Urine Ketones   


 


Urine Blood   


 


Urine Nitrate   


 


Urine Bilirubin   


 


Urine Urobilinogen   


 


Ur Leukocyte Esterase   


 


Urine RBC (Auto)   


 


Urine Microscopic WBC   


 


Ur Squamous Epith Cells   


 


Hyaline Casts   














  07/03/18 07/03/18





  10:21 16:29


 


WBC  


 


RBC  


 


Hgb  


 


Hct  


 


MCV  


 


MCH  


 


MCHC  


 


RDW  


 


Plt Count  


 


MPV  


 


Neut % (Auto)  


 


Lymph % (Auto)  


 


Mono % (Auto)  


 


Eos % (Auto)  


 


Baso % (Auto)  


 


Neut # (Auto)  


 


Lymph # (Auto)  


 


Mono # (Auto)  


 


Eos # (Auto)  


 


Baso # (Auto)  


 


PT  


 


INR  


 


APTT  


 


Sodium  


 


Potassium  


 


Chloride  


 


Carbon Dioxide  


 


Anion Gap  


 


BUN  


 


Creatinine  


 


Est GFR ( Amer)  


 


Est GFR (Non-Af Amer)  


 


POC Glucose (mg/dL)   76


 


Random Glucose  


 


Calcium  


 


Total Bilirubin  


 


AST  


 


ALT  


 


Alkaline Phosphatase  


 


Total Protein  


 


Albumin  


 


Globulin  


 


Albumin/Globulin Ratio  


 


Urine Color  Yellow 


 


Urine Clarity  Slighty-cloudy 


 


Urine pH  6.0 


 


Ur Specific Gravity  1.026 


 


Urine Protein  30 


 


Urine Glucose (UA)  >=500 


 


Urine Ketones  Negative 


 


Urine Blood  Negative 


 


Urine Nitrate  Negative 


 


Urine Bilirubin  Negative 


 


Urine Urobilinogen  2.0 H 


 


Ur Leukocyte Esterase  Mod 


 


Urine RBC (Auto)  10 H 


 


Urine Microscopic WBC  3 


 


Ur Squamous Epith Cells  4 


 


Hyaline Casts  0-2 














Assessment & Plan





- Assessment and Plan (Free Text)


Assessment: 





Duodenitis 


CT reads duodenitis can not rule out Possible microperf. No free air





-GI on board


-PPI


-NPO


-IVF


-Pain/nausea control


-Serial abd exam





WIll DW Dr. Read

## 2018-07-04 LAB
ALBUMIN SERPL-MCNC: 3.9 G/DL (ref 3.5–5)
ALBUMIN/GLOB SERPL: 1.1 {RATIO} (ref 1–2.1)
ALT SERPL-CCNC: 31 U/L (ref 9–52)
AST SERPL-CCNC: 21 U/L (ref 14–36)
BUN SERPL-MCNC: 7 MG/DL (ref 7–17)
CALCIUM SERPL-MCNC: 9 MG/DL (ref 8.4–10.2)
ERYTHROCYTE [DISTWIDTH] IN BLOOD BY AUTOMATED COUNT: 14 % (ref 11.5–14.5)
GFR NON-AFRICAN AMERICAN: > 60
HGB BLD-MCNC: 12.5 G/DL (ref 12–16)
MCH RBC QN AUTO: 29.2 PG (ref 27–31)
MCHC RBC AUTO-ENTMCNC: 33 G/DL (ref 33–37)
MCV RBC AUTO: 88.3 FL (ref 81–99)
PLATELET # BLD: 225 K/UL (ref 130–400)
RBC # BLD AUTO: 4.28 MIL/UL (ref 3.8–5.2)
WBC # BLD AUTO: 4.3 K/UL (ref 4.8–10.8)

## 2018-07-04 RX ADMIN — WATER SCH MLS/HR: 1 INJECTION INTRAMUSCULAR; INTRAVENOUS; SUBCUTANEOUS at 09:18

## 2018-07-04 RX ADMIN — INSULIN DETEMIR SCH UNITS: 100 INJECTION, SOLUTION SUBCUTANEOUS at 11:28

## 2018-07-04 RX ADMIN — ALBUTEROL SULFATE SCH PUFF: 90 AEROSOL, METERED RESPIRATORY (INHALATION) at 15:39

## 2018-07-04 RX ADMIN — ALBUTEROL SULFATE SCH PUFF: 90 AEROSOL, METERED RESPIRATORY (INHALATION) at 04:13

## 2018-07-04 RX ADMIN — ALBUTEROL SULFATE SCH PUFF: 90 AEROSOL, METERED RESPIRATORY (INHALATION) at 23:42

## 2018-07-04 RX ADMIN — INSULIN LISPRO SCH UNITS: 100 INJECTION, SOLUTION INTRAVENOUS; SUBCUTANEOUS at 12:48

## 2018-07-04 RX ADMIN — ALBUTEROL SULFATE SCH PUFF: 90 AEROSOL, METERED RESPIRATORY (INHALATION) at 12:48

## 2018-07-04 RX ADMIN — EMTRICITABINE AND TENOFOVIR DISOPROXIL FUMARATE SCH TAB: 200; 300 TABLET, FILM COATED ORAL at 10:02

## 2018-07-04 RX ADMIN — INSULIN LISPRO SCH: 100 INJECTION, SOLUTION INTRAVENOUS; SUBCUTANEOUS at 06:57

## 2018-07-04 RX ADMIN — WATER SCH MLS/HR: 1 INJECTION INTRAMUSCULAR; INTRAVENOUS; SUBCUTANEOUS at 15:39

## 2018-07-04 RX ADMIN — ENOXAPARIN SODIUM SCH MG: 40 INJECTION SUBCUTANEOUS at 20:34

## 2018-07-04 RX ADMIN — ALBUTEROL SULFATE SCH PUFF: 90 AEROSOL, METERED RESPIRATORY (INHALATION) at 20:34

## 2018-07-04 RX ADMIN — INSULIN DETEMIR SCH: 100 INJECTION, SOLUTION SUBCUTANEOUS at 09:31

## 2018-07-04 RX ADMIN — ALBUTEROL SULFATE SCH PUFF: 90 AEROSOL, METERED RESPIRATORY (INHALATION) at 09:19

## 2018-07-04 RX ADMIN — INSULIN LISPRO SCH UNITS: 100 INJECTION, SOLUTION INTRAVENOUS; SUBCUTANEOUS at 17:33

## 2018-07-04 RX ADMIN — WATER SCH MLS/HR: 1 INJECTION INTRAMUSCULAR; INTRAVENOUS; SUBCUTANEOUS at 04:11

## 2018-07-04 RX ADMIN — INSULIN LISPRO SCH: 100 INJECTION, SOLUTION INTRAVENOUS; SUBCUTANEOUS at 21:58

## 2018-07-04 NOTE — CP.PCM.CON
<Meseret Gonzalez - Last Filed: 07/04/18 14:54>





History of Present Illness





- History of Present Illness


History of Present Illness: 


Initial PGY5 GI Consult 





Soo Saucedo is a 63F w/ hx of DM, HIV, HTN who presented with diffuse abd 

pain. She noted that the pain started a few days prior to admission. Pt states 

that her last BM was 1 week prior. She notes having chronic constipation and 

her typical BM is every 3 days. She has tried dulcolax without sig improvement. 

She notes some improvement in pain after passing flatus. She states that the 

pain is around 7 out of 10 and intermittent. She denies any fever, chills or 

diaphoresis. CT abd revealed significant stool burden in the colon and proximal 

duodenal inflammation (likley early ulcer). Radiology could not rule out a 

microperforation. Pt was NPO overnight and able to tolerate liquid diet in the 

AM. She has had prior EGDs and colonoscopies with Dr. Rojas. 





PMHx: HIV, IDDM2, HTN, HLD, Asthma


PsurgHx: hysterecomty (2011) due to fibroids


FamilyHx: denies family hx of CA, MI, DM


Social Hx: 4 ciggarettes per day since age 11, denies ETOH/drug abuse


Endo Hx: 10/2016: EGD- no acute finding, 1/2017: small hiatial hernia; 8/2017 & 

10/2017: poor prep 





ROS: 12 point ROS conducted, neg other than above








Past Patient History





- Infectious Disease


Hx of Infectious Diseases: None





- Past Medical History & Family History


Past Medical History?: Yes





- Past Social History


Smoking Status: Light Smoker < 10 Cigarettes Daily





- CARDIAC


Hx Cardiac Disorders: Yes


Hx Hypercholesterolemia: Yes


Hx Hypertension: Yes





- PULMONARY


Hx Respiratory Disorders: Yes


Hx Asthma: Yes





- NEUROLOGICAL


Hx Neurological Disorder: No





- HEENT


Hx HEENT Problems: No





- RENAL


Hx Chronic Kidney Disease: No





- ENDOCRINE/METABOLIC


Hx Endocrine Disorders: Yes


Hx Diabetes Mellitus Type 2: Yes





- HEMATOLOGICAL/ONCOLOGICAL


Hx Blood Disorders: Yes


Hx Anemia: Yes


Hx Human Immunodeficiency Virus (HIV): Yes





- INTEGUMENTARY


Hx Dermatological Problems: No





- MUSCULOSKELETAL/RHEUMATOLOGICAL


Hx Musculoskeletal Disorders: No


Hx Falls: Yes





- GASTROINTESTINAL


Hx Gastrointestinal Disorders: Yes


Hx Gastritis: Yes





- GENITOURINARY/GYNECOLOGICAL


Hx Genitourinary Disorders: No





- PSYCHIATRIC


Hx Psychophysiologic Disorder: Yes


Hx Depression: Yes





- SURGICAL HISTORY


Hx Surgeries: Yes


Hx Hysterectomy: Yes


Other/Comment: RT PAROTID MASS NEEDLE ASPIRATION





- ANESTHESIA


Hx Anesthesia: Yes


Hx Anesthesia Reactions: No


Hx Malignant Hyperthermia: No





Meds


Allergies/Adverse Reactions: 


 Allergies











Allergy/AdvReac Type Severity Reaction Status Date / Time


 


tramadol Allergy  DIZZINESS Verified 07/03/18 08:55














- Medications


Medications: 


 Current Medications





Albuterol (Ventolin Hfa 90 Mcg/Actuation (8 G))  2 puff INH Q4H [facility]


   Last Admin: 07/04/18 12:48 Dose:  2 puff


Bupropion HCl (Wellbutrin)  75 mg PO DAILY [facility]


   Last Admin: 07/04/18 10:01 Dose:  75 mg


Dextrose (Dextrose 50% Inj)  0 ml IV STAT PRN; Protocol


   PRN Reason: Hypoglycemia Protocol


   Last Admin: 07/03/18 18:28 Dose:  50 ml


Dextrose (Glutose 15)  0 gm PO ONCE PRN; Protocol


   PRN Reason: Hypoglycemia Protocol


Dolutegravir Sodium (Tivicay)  50 mg PO DAILY [facility]


   PRN Reason: Protocol


   Last Admin: 07/04/18 10:01 Dose:  50 mg


Duloxetine HCl (Cymbalta)  60 mg PO DAILY [facility]


   Last Admin: 07/04/18 10:02 Dose:  60 mg


Emtricitabine/Tenofovir (Truvada 200 Mg-300 Mg)  1 tab PO DAILY [facility]


   PRN Reason: Protocol


   Last Admin: 07/04/18 10:02 Dose:  1 tab


Enalapril Maleate (Vasotec)  10 mg PO DAILY [facility]


   Last Admin: 07/04/18 10:02 Dose:  10 mg


Enoxaparin Sodium (Lovenox)  40 mg SC DAILY [facility]


   PRN Reason: Protocol


Glucagon (Glucagen Diagnostic Kit)  0 mg IM STAT PRN; Protocol


   PRN Reason: Hypoglycemia Protocol


Piperacillin Sod/Tazobactam (Sod 3.375 gm/ Sodium Chloride)  100 mls @ 100 mls/

hr IVPB Q6 [facility]


   PRN Reason: Protocol


   Last Admin: 07/04/18 09:18 Dose:  100 mls/hr


Dextrose/Sodium Chloride (Dextrose 5%-0.45% Ns 500 Ml)  1,000 mls @ 100 mls/hr 

IV .Q10H [facility]


   Last Admin: 07/04/18 04:13 Dose:  100 mls/hr


Insulin Detemir (Levemir)  30 units SC QAM [facility]


   Last Admin: 07/04/18 11:28 Dose:  30 units


Insulin Human Lispro (Humalog)  0 units SC ACHS [facility]


   PRN Reason: Protocol


   Last Admin: 07/04/18 12:48 Dose:  2 units


Morphine Sulfate (Morphine)  2 mg IVP Q4 PRN


   PRN Reason: Pain, severe (8-10)


   Last Admin: 07/04/18 05:56 Dose:  2 mg


Ondansetron HCl (Zofran Inj)  4 mg IVP Q6 PRN


   PRN Reason: Nausea/Vomiting


Pantoprazole Sodium (Protonix Inj)  40 mg IVP BID [facility]


   Last Admin: 07/04/18 09:20 Dose:  40 mg


Polyethylene Glycol/Electrolytes (Golytely)  2,000 ml PO ONCE ONE


   Stop: 07/04/18 14:11











Physical Exam





- Constitutional


Appears: Well, No Acute Distress





- Head Exam


Head Exam: ATRAUMATIC, NORMOCEPHALIC





- Eye Exam


Eye Exam: Normal appearance





- ENT Exam


ENT Exam: Mucous Membranes Moist, Normal Exam





- Respiratory Exam


Respiratory Exam: Clear to Auscultation Bilateral, NORMAL BREATHING PATTERN.  

absent: Rales, Rhonchi, Wheezes, Respiratory Distress





- Cardiovascular Exam


Cardiovascular Exam: REGULAR RHYTHM, +S1, +S2





- GI/Abdominal Exam


GI & Abdominal Exam: Hypoactive Bowel Sounds, Soft.  absent: Distended, Firm, 

Guarding, Organomegaly, Rebound, Rigid, Tenderness





- Extremities Exam


Extremities exam: Negative for: joint swelling, pedal edema





- Neurological Exam


Neurological exam: Alert, Oriented x3





- Psychiatric Exam


Psychiatric exam: Normal Affect, Normal Mood





- Skin


Skin Exam: Dry, Intact, Normal Color, Warm





Results





- Vital Signs


Recent Vital Signs: 


 Last Vital Signs











Temp  97.5 F L  07/04/18 09:00


 


Pulse  80   07/04/18 09:00


 


Resp  19   07/04/18 09:00


 


BP  118/81   07/04/18 09:00


 


Pulse Ox  100   07/04/18 09:00














- Labs


Result Diagrams: 


 07/04/18 12:07





 07/04/18 12:07


Labs: 


 Laboratory Results - last 24 hr











  07/03/18 07/03/18 07/03/18





  16:29 18:19 18:57


 


WBC   


 


RBC   


 


Hgb   


 


Hct   


 


MCV   


 


MCH   


 


MCHC   


 


RDW   


 


Plt Count   


 


Sodium   


 


Potassium   


 


Chloride   


 


Carbon Dioxide   


 


Anion Gap   


 


BUN   


 


Creatinine   


 


Est GFR ( Amer)   


 


Est GFR (Non-Af Amer)   


 


POC Glucose (mg/dL)  76  59 L  222 H


 


Random Glucose   


 


Calcium   


 


Total Bilirubin   


 


AST   


 


ALT   


 


Alkaline Phosphatase   


 


Total Protein   


 


Albumin   


 


Globulin   


 


Albumin/Globulin Ratio   














  07/03/18 07/04/18 07/04/18





  22:18 05:43 10:49


 


WBC   


 


RBC   


 


Hgb   


 


Hct   


 


MCV   


 


MCH   


 


MCHC   


 


RDW   


 


Plt Count   


 


Sodium   


 


Potassium   


 


Chloride   


 


Carbon Dioxide   


 


Anion Gap   


 


BUN   


 


Creatinine   


 


Est GFR ( Amer)   


 


Est GFR (Non-Af Amer)   


 


POC Glucose (mg/dL)  110  74  180 H


 


Random Glucose   


 


Calcium   


 


Total Bilirubin   


 


AST   


 


ALT   


 


Alkaline Phosphatase   


 


Total Protein   


 


Albumin   


 


Globulin   


 


Albumin/Globulin Ratio   














  07/04/18 07/04/18





  12:07 12:07


 


WBC  4.3 L 


 


RBC  4.28 


 


Hgb  12.5 


 


Hct  37.8 


 


MCV  88.3 


 


MCH  29.2 


 


MCHC  33.0 


 


RDW  14.0 


 


Plt Count  225 


 


Sodium   137


 


Potassium   4.1


 


Chloride   105


 


Carbon Dioxide   25


 


Anion Gap   11


 


BUN   7


 


Creatinine   0.7


 


Est GFR ( Amer)   > 60


 


Est GFR (Non-Af Amer)   > 60


 


POC Glucose (mg/dL)  


 


Random Glucose   172 H


 


Calcium   9.0


 


Total Bilirubin   0.5


 


AST   21


 


ALT   31


 


Alkaline Phosphatase   176 H


 


Total Protein   7.4


 


Albumin   3.9


 


Globulin   3.5


 


Albumin/Globulin Ratio   1.1














Assessment & Plan





- Assessment and Plan (Free Text)


Assessment: 


Soo Saucedo is a 63F w/ hx of HIV, HTN, DM who presented to the ED with 

abd pain 





Acute on chronic constipation 


abd pain 2/2 above 


Duodenal inflammation, possible ulcer, cannot r/o microperf


poor prep colonoscopy 








Plan: 


-started on clears by surgery 


-tolerated diet with signs of peritonitis 


-vitals stable and afebrile 


-recommend daily bowel regiment of miralax BID


-will give 2000ml of golytly today for evacuation of bowels 


-recommend EGD as an oupt with Dr. Rojas


-protonix 40mg PO BID 


-eat more fiber and drink more water 





D/W Dr. Nassar








<Diane Nassar - Last Filed: 07/04/18 15:09>





Meds





- Medications


Medications: 


 Current Medications





Albuterol (Ventolin Hfa 90 Mcg/Actuation (8 G))  2 puff INH Q4H [facility]


   Last Admin: 07/04/18 12:48 Dose:  2 puff


Bupropion HCl (Wellbutrin)  75 mg PO DAILY [facility]


   Last Admin: 07/04/18 10:01 Dose:  75 mg


Dextrose (Dextrose 50% Inj)  0 ml IV STAT PRN; Protocol


   PRN Reason: Hypoglycemia Protocol


   Last Admin: 07/03/18 18:28 Dose:  50 ml


Dextrose (Glutose 15)  0 gm PO ONCE PRN; Protocol


   PRN Reason: Hypoglycemia Protocol


Dolutegravir Sodium (Tivicay)  50 mg PO DAILY [facility]


   PRN Reason: Protocol


   Last Admin: 07/04/18 10:01 Dose:  50 mg


Duloxetine HCl (Cymbalta)  60 mg PO DAILY [facility]


   Last Admin: 07/04/18 10:02 Dose:  60 mg


Emtricitabine/Tenofovir (Truvada 200 Mg-300 Mg)  1 tab PO DAILY [facility]


   PRN Reason: Protocol


   Last Admin: 07/04/18 10:02 Dose:  1 tab


Enalapril Maleate (Vasotec)  10 mg PO DAILY [facility]


   Last Admin: 07/04/18 10:02 Dose:  10 mg


Enoxaparin Sodium (Lovenox)  40 mg SC DAILY [facility]


   PRN Reason: Protocol


Glucagon (Glucagen Diagnostic Kit)  0 mg IM STAT PRN; Protocol


   PRN Reason: Hypoglycemia Protocol


Piperacillin Sod/Tazobactam (Sod 3.375 gm/ Sodium Chloride)  100 mls @ 100 mls/

hr IVPB Q6 [facility]


   PRN Reason: Protocol


   Last Admin: 07/04/18 09:18 Dose:  100 mls/hr


Dextrose/Sodium Chloride (Dextrose 5%-0.45% Ns 500 Ml)  1,000 mls @ 100 mls/hr 

IV .Q10H [facility]


   Last Admin: 07/04/18 04:13 Dose:  100 mls/hr


Insulin Detemir (Levemir)  30 units SC QAM [facility]


   Last Admin: 07/04/18 11:28 Dose:  30 units


Insulin Human Lispro (Humalog)  0 units SC ACHS [facility]


   PRN Reason: Protocol


   Last Admin: 07/04/18 12:48 Dose:  2 units


Morphine Sulfate (Morphine)  2 mg IVP Q4 PRN


   PRN Reason: Pain, severe (8-10)


   Last Admin: 07/04/18 05:56 Dose:  2 mg


Ondansetron HCl (Zofran Inj)  4 mg IVP Q6 PRN


   PRN Reason: Nausea/Vomiting


Pantoprazole Sodium (Protonix Inj)  40 mg IVP BID [facility]


   Last Admin: 07/04/18 09:20 Dose:  40 mg











Results





- Vital Signs


Recent Vital Signs: 


 Last Vital Signs











Temp  97.5 F L  07/04/18 09:00


 


Pulse  80   07/04/18 09:00


 


Resp  19   07/04/18 09:00


 


BP  118/81   07/04/18 09:00


 


Pulse Ox  100   07/04/18 09:00














- Labs


Result Diagrams: 


 07/04/18 12:07





 07/04/18 12:07


Labs: 


 Laboratory Results - last 24 hr











  07/03/18 07/03/18 07/03/18





  16:29 18:19 18:57


 


WBC   


 


RBC   


 


Hgb   


 


Hct   


 


MCV   


 


MCH   


 


MCHC   


 


RDW   


 


Plt Count   


 


Sodium   


 


Potassium   


 


Chloride   


 


Carbon Dioxide   


 


Anion Gap   


 


BUN   


 


Creatinine   


 


Est GFR ( Amer)   


 


Est GFR (Non-Af Amer)   


 


POC Glucose (mg/dL)  76  59 L  222 H


 


Random Glucose   


 


Calcium   


 


Total Bilirubin   


 


AST   


 


ALT   


 


Alkaline Phosphatase   


 


Total Protein   


 


Albumin   


 


Globulin   


 


Albumin/Globulin Ratio   














  07/03/18 07/04/18 07/04/18





  22:18 05:43 10:49


 


WBC   


 


RBC   


 


Hgb   


 


Hct   


 


MCV   


 


MCH   


 


MCHC   


 


RDW   


 


Plt Count   


 


Sodium   


 


Potassium   


 


Chloride   


 


Carbon Dioxide   


 


Anion Gap   


 


BUN   


 


Creatinine   


 


Est GFR ( Amer)   


 


Est GFR (Non-Af Amer)   


 


POC Glucose (mg/dL)  110  74  180 H


 


Random Glucose   


 


Calcium   


 


Total Bilirubin   


 


AST   


 


ALT   


 


Alkaline Phosphatase   


 


Total Protein   


 


Albumin   


 


Globulin   


 


Albumin/Globulin Ratio   














  07/04/18 07/04/18





  12:07 12:07


 


WBC  4.3 L 


 


RBC  4.28 


 


Hgb  12.5 


 


Hct  37.8 


 


MCV  88.3 


 


MCH  29.2 


 


MCHC  33.0 


 


RDW  14.0 


 


Plt Count  225 


 


Sodium   137


 


Potassium   4.1


 


Chloride   105


 


Carbon Dioxide   25


 


Anion Gap   11


 


BUN   7


 


Creatinine   0.7


 


Est GFR ( Amer)   > 60


 


Est GFR (Non-Af Amer)   > 60


 


POC Glucose (mg/dL)  


 


Random Glucose   172 H


 


Calcium   9.0


 


Total Bilirubin   0.5


 


AST   21


 


ALT   31


 


Alkaline Phosphatase   176 H


 


Total Protein   7.4


 


Albumin   3.9


 


Globulin   3.5


 


Albumin/Globulin Ratio   1.1














Attending/Attestation





- Attestation


I have personally seen and examined this patient.: Yes


I have fully participated in the care of the patient.: Yes


I have reviewed all pertinent clinical information: Yes


Notes (Text): 





07/04/18 15:03


Patient seen and examined with GI fellow on rounds earlier today. This is a 63 

yr old F w/ hx of HIV, HTN, DM who presented to the ED with lower abdominal 

pain. CT abdomen reviewed by me. Shows colon full of stool. Last EGD in Jan 2017 that showed 1 cm hital hernia, gastritis and esophagitis. No history of H 

pylori or NSAID use. This morning she has no pain. Tolerated clear liquid diet. 

Denies having epigastric pain prior to admission. No air leak seen on the CT. 

Patient has pvt GI outside who she will follow with for repeat EGD once 

discharged. Adavnce diet as tolerated. Past colonoscopies with poor prep. Will 

go stringent bowel regimen and laxatives. No indication for EGD specifically if 

there is microperforation. PPI daily. No peritoneal signs.  


Follow with Dr Rojas upon discharge. No further recommendations. Thank you for 

letting us participate in the care of your patient. Rest of plan as per 

surgical service

## 2018-07-04 NOTE — CP.PCM.PN
Subjective





- Date & Time of Evaluation


Date of Evaluation: 07/04/18


Time of Evaluation: 07:44





- Subjective


Subjective: 





General surgery progress note for Dr. Rachel Taylor, PGY-2





Pt S & E at bedside at 0650





pt reports abdominal pain is improved.  Denies N & V, F & C.  No other 

complaints at this time. 





Objective





- Vital Signs/Intake and Output


Vital Signs (last 24 hours): 


 











Temp Pulse Resp BP Pulse Ox


 


 97.5 F L  96 H  20   122/82   100 


 


 07/04/18 00:55  07/04/18 00:55  07/04/18 00:55  07/04/18 00:55  07/04/18 00:55











- Medications


Medications: 


 Current Medications





Albuterol (Ventolin Hfa 90 Mcg/Actuation (8 G))  2 puff INH Q4H [facility]


   Last Admin: 07/04/18 04:13 Dose:  2 puff


Bupropion HCl (Wellbutrin)  75 mg PO DAILY [facility]


Dextrose (Dextrose 50% Inj)  0 ml IV STAT PRN; Protocol


   PRN Reason: Hypoglycemia Protocol


   Last Admin: 07/03/18 18:28 Dose:  50 ml


Dextrose (Glutose 15)  0 gm PO ONCE PRN; Protocol


   PRN Reason: Hypoglycemia Protocol


Dolutegravir Sodium (Tivicay)  50 mg PO DAILY [facility]


   PRN Reason: Protocol


Duloxetine HCl (Cymbalta)  60 mg PO DAILY [facility]


Emtricitabine/Tenofovir (Truvada 200 Mg-300 Mg)  1 tab PO DAILY [facility]


   PRN Reason: Protocol


Enalapril Maleate (Vasotec)  10 mg PO DAILY [facility]


Glucagon (Glucagen Diagnostic Kit)  0 mg IM STAT PRN; Protocol


   PRN Reason: Hypoglycemia Protocol


Piperacillin Sod/Tazobactam (Sod 3.375 gm/ Sodium Chloride)  100 mls @ 100 mls/

hr IVPB Q6 [facility]


   PRN Reason: Protocol


   Last Admin: 07/04/18 04:11 Dose:  100 mls/hr


Dextrose/Sodium Chloride (Dextrose 5%-0.45% Ns 500 Ml)  1,000 mls @ 100 mls/hr 

IV .Q10H [facility]


   Last Admin: 07/04/18 04:13 Dose:  100 mls/hr


Insulin Detemir (Levemir)  30 units SC QAM [facility]


Insulin Human Lispro (Humalog)  0 units SC ACHS [facility]


   PRN Reason: Protocol


   Last Admin: 07/04/18 06:57 Dose:  Not Given


Ketorolac Tromethamine (Toradol)  15 mg IVP Q6 PRN


   PRN Reason: Pain, moderate (4-7)


Morphine Sulfate (Morphine)  2 mg IVP Q4 PRN


   PRN Reason: Pain, severe (8-10)


   Last Admin: 07/04/18 05:56 Dose:  2 mg


Ondansetron HCl (Zofran Inj)  4 mg IVP Q6 PRN


   PRN Reason: Nausea/Vomiting


Pantoprazole Sodium (Protonix Inj)  40 mg IVP BID [facility]


   Last Admin: 07/03/18 15:30 Dose:  40 mg











- Labs


Labs: 


 





 07/03/18 09:59 





 07/03/18 10:17 





 











PT  10.9 Seconds (9.8-13.1)   07/03/18  10:17    


 


INR  1.0  (0.9-1.2)   07/03/18  10:17    


 


APTT  32.2 Seconds (25.6-37.1)   07/03/18  10:17    














- Constitutional


Appears: Non-toxic, No Acute Distress





- Head Exam


Head Exam: ATRAUMATIC, NORMAL INSPECTION, NORMOCEPHALIC





- Eye Exam


Eye Exam: EOMI, Normal appearance





- ENT Exam


ENT Exam: Mucous Membranes Moist, Normal Exam





- Neck Exam


Neck Exam: Full ROM, Normal Inspection





- Respiratory Exam


Respiratory Exam: NORMAL BREATHING PATTERN





- Cardiovascular Exam


Cardiovascular Exam: REGULAR RHYTHM, +S1, +S2





- GI/Abdominal Exam


GI & Abdominal Exam: Soft, Tenderness (lower abdomen).  absent: Distended, Firm

, Guarding, Rigid





- Extremities Exam


Extremities Exam: Normal Inspection





- Neurological Exam


Neurological Exam: Alert, Awake, CN II-XII Intact, Oriented x3





- Psychiatric Exam


Psychiatric exam: Normal Affect, Normal Mood





- Skin


Skin Exam: Dry, Intact, Normal Color, Warm





Assessment and Plan





- Assessment and Plan (Free Text)


Assessment: 





63F w/duodenitis


Plan: 





NPO


pain control


IVF


Serial ab exams


Anti-emetic


OOBTC


Activity as tolerated


GI following- possible EGD tomorrow


PPI





DW attending





Claudia, PGY-2

## 2018-07-04 NOTE — CP.PCM.PN
Subjective





- Date & Time of Evaluation


Date of Evaluation: 07/04/18


Time of Evaluation: 07:45





- Subjective


Subjective: 





Patient seen and examined at bedside, NAD, c/o diffuse abdominal pain, states 

she got only some relief from pain medication, c/o feeling constipated yet 

after BM x1, denies N/V/D, blood in the stools, chills or HA. 











Objective





- Vital Signs/Intake and Output


Vital Signs (last 24 hours): 


 











Temp Pulse Resp BP Pulse Ox


 


 97.5 F L  80   19   118/81   100 


 


 07/04/18 08:15  07/04/18 08:15  07/04/18 08:15  07/04/18 08:15  07/04/18 08:15











- Medications


Medications: 


 Current Medications





Albuterol (Ventolin Hfa 90 Mcg/Actuation (8 G))  2 puff INH Q4H [facility]


   Last Admin: 07/04/18 04:13 Dose:  2 puff


Bisacodyl (Dulcolax)  10 mg MN ONCE ONE


   Stop: 07/04/18 10:01


Bupropion HCl (Wellbutrin)  75 mg PO DAILY [facility]


Dextrose (Dextrose 50% Inj)  0 ml IV STAT PRN; Protocol


   PRN Reason: Hypoglycemia Protocol


   Last Admin: 07/03/18 18:28 Dose:  50 ml


Dextrose (Glutose 15)  0 gm PO ONCE PRN; Protocol


   PRN Reason: Hypoglycemia Protocol


Dolutegravir Sodium (Tivicay)  50 mg PO DAILY [facility]


   PRN Reason: Protocol


Duloxetine HCl (Cymbalta)  60 mg PO DAILY [facility]


Emtricitabine/Tenofovir (Truvada 200 Mg-300 Mg)  1 tab PO DAILY [facility]


   PRN Reason: Protocol


Enalapril Maleate (Vasotec)  10 mg PO DAILY [facility]


Glucagon (Glucagen Diagnostic Kit)  0 mg IM STAT PRN; Protocol


   PRN Reason: Hypoglycemia Protocol


Piperacillin Sod/Tazobactam (Sod 3.375 gm/ Sodium Chloride)  100 mls @ 100 mls/

hr IVPB Q6 [facility]


   PRN Reason: Protocol


   Last Admin: 07/04/18 04:11 Dose:  100 mls/hr


Dextrose/Sodium Chloride (Dextrose 5%-0.45% Ns 500 Ml)  1,000 mls @ 100 mls/hr 

IV .Q10H [facility]


   Last Admin: 07/04/18 04:13 Dose:  100 mls/hr


Insulin Detemir (Levemir)  30 units SC QAM [facility]


Insulin Human Lispro (Humalog)  0 units SC ACHS [facility]


   PRN Reason: Protocol


   Last Admin: 07/04/18 06:57 Dose:  Not Given


Ketorolac Tromethamine (Toradol)  15 mg IVP Q6 PRN


   PRN Reason: Pain, moderate (4-7)


Morphine Sulfate (Morphine)  2 mg IVP Q4 PRN


   PRN Reason: Pain, severe (8-10)


   Last Admin: 07/04/18 05:56 Dose:  2 mg


Ondansetron HCl (Zofran Inj)  4 mg IVP Q6 PRN


   PRN Reason: Nausea/Vomiting


Pantoprazole Sodium (Protonix Inj)  40 mg IVP BID [facility]


   Last Admin: 07/03/18 15:30 Dose:  40 mg











- Labs


Labs: 


 





 07/03/18 09:59 





 07/03/18 10:17 





 











PT  10.9 Seconds (9.8-13.1)   07/03/18  10:17    


 


INR  1.0  (0.9-1.2)   07/03/18  10:17    


 


APTT  32.2 Seconds (25.6-37.1)   07/03/18  10:17    














- Constitutional


Appears: No Acute Distress





- Head Exam


Head Exam: ATRAUMATIC, NORMOCEPHALIC





- Eye Exam


Eye Exam: EOMI, PERRL





- ENT Exam


ENT Exam: Mucous Membranes Moist





- Respiratory Exam


Respiratory Exam: Clear to Ausculation Bilateral.  absent: Rales, Rhonchi, 

Wheezes





- Cardiovascular Exam


Cardiovascular Exam: REGULAR RHYTHM, +S1, +S2





- GI/Abdominal Exam


GI & Abdominal Exam: Distended, Soft, Tenderness, Hypoactive Bowel Sounds


Additional comments: 





diffuse tenderness palption of the abdomen





- Extremities Exam


Extremities Exam: Full ROM





- Neurological Exam


Neurological Exam: Alert, Awake, CN II-XII Intact, Oriented x3





- Psychiatric Exam


Psychiatric exam: Anxious





- Skin


Skin Exam: Normal Color, Warm





Assessment and Plan





- Assessment and Plan (Free Text)


Assessment: 





62 y/o female, PMHx of HIV, HTN, IDDM2 and Asthma, admitted yesterday for 

abdominal pain x 5 days, had a CT of abdomen report with possible Duodenitis and

/or even ulcer and possible microperfortation, surgical team and GI was 

consulted, now pending GI recommendation. Chart was reviewed, VS and nursing 

notes.





Plan: 





Plan: 





 Duodenitis/Abdominal Pain/Constipation 


-NPO


-CT scan described above (see full report)


-GI consulted, pending further recommnedations and Upper endoscopic procedure 


-Protonix 40mg iv-BID


-Fleet enema


-pain control-Toradol 15 iv q6h prn, morphine 2mg iv q4h prn


-zofran PRN nausea


-IV fluid hydration-D5/0.45NS 


-Surgery consult-f/u recommendations


-Zosyn 3.375 iv q6h 





 HIV


-stable


-VL undetectable


-CD4 >600 


-c/w current medications-Truvada 300/200 po qd, Tivicay 50mg po qd





 IDDM2


-c/w nighttime levemir dose


-lispro coverage scale


-hypoglycemia protocol





HTN


-stable


-Enalpril 10 po qd





 Asthma 


-stable


-c/w home meds as ordered


-albuterol prn SOB





 DVT PPx


-lovenox 40 sc qd


-Ambulation

## 2018-07-05 VITALS — TEMPERATURE: 98.1 F | DIASTOLIC BLOOD PRESSURE: 96 MMHG | SYSTOLIC BLOOD PRESSURE: 141 MMHG | HEART RATE: 90 BPM

## 2018-07-05 VITALS — RESPIRATION RATE: 19 BRPM

## 2018-07-05 VITALS — OXYGEN SATURATION: 96 %

## 2018-07-05 LAB
ALBUMIN SERPL-MCNC: 3.9 G/DL (ref 3.5–5)
ALBUMIN/GLOB SERPL: 1.2 {RATIO} (ref 1–2.1)
ALT SERPL-CCNC: 26 U/L (ref 9–52)
AST SERPL-CCNC: 34 U/L (ref 14–36)
BUN SERPL-MCNC: 4 MG/DL (ref 7–17)
CALCIUM SERPL-MCNC: 9.5 MG/DL (ref 8.4–10.2)
ERYTHROCYTE [DISTWIDTH] IN BLOOD BY AUTOMATED COUNT: 13.9 % (ref 11.5–14.5)
GFR NON-AFRICAN AMERICAN: > 60
HGB BLD-MCNC: 12.5 G/DL (ref 12–16)
MCH RBC QN AUTO: 29 PG (ref 27–31)
MCHC RBC AUTO-ENTMCNC: 32.9 G/DL (ref 33–37)
MCV RBC AUTO: 88 FL (ref 81–99)
PLATELET # BLD: 231 K/UL (ref 130–400)
RBC # BLD AUTO: 4.31 MIL/UL (ref 3.8–5.2)
WBC # BLD AUTO: 3.9 K/UL (ref 4.8–10.8)

## 2018-07-05 RX ADMIN — ALBUTEROL SULFATE SCH PUFF: 90 AEROSOL, METERED RESPIRATORY (INHALATION) at 03:56

## 2018-07-05 RX ADMIN — INSULIN DETEMIR SCH UNITS: 100 INJECTION, SOLUTION SUBCUTANEOUS at 08:59

## 2018-07-05 RX ADMIN — ALBUTEROL SULFATE SCH PUFF: 90 AEROSOL, METERED RESPIRATORY (INHALATION) at 11:51

## 2018-07-05 RX ADMIN — EMTRICITABINE AND TENOFOVIR DISOPROXIL FUMARATE SCH TAB: 200; 300 TABLET, FILM COATED ORAL at 08:58

## 2018-07-05 RX ADMIN — INSULIN LISPRO SCH UNITS: 100 INJECTION, SOLUTION INTRAVENOUS; SUBCUTANEOUS at 13:06

## 2018-07-05 RX ADMIN — ENOXAPARIN SODIUM SCH MG: 40 INJECTION SUBCUTANEOUS at 08:58

## 2018-07-05 RX ADMIN — INSULIN LISPRO SCH: 100 INJECTION, SOLUTION INTRAVENOUS; SUBCUTANEOUS at 06:38

## 2018-07-05 RX ADMIN — ALBUTEROL SULFATE SCH PUFF: 90 AEROSOL, METERED RESPIRATORY (INHALATION) at 08:58

## 2018-07-05 NOTE — CP.PCM.PN
Subjective





- Date & Time of Evaluation


Date of Evaluation: 07/05/18


Time of Evaluation: 07:49





- Subjective


Subjective: 





Surgery 





Pt seen and examined. No acute events. Denies fever, nausea. Pain improved. 

Seen by GI. 





Objective





- Vital Signs/Intake and Output


Vital Signs (last 24 hours): 


 











Temp Pulse Resp BP Pulse Ox


 


 97.8 F   91 H  19   130/86   99 


 


 07/05/18 05:30  07/05/18 05:30  07/05/18 05:30  07/05/18 05:30  07/05/18 05:30











- Medications


Medications: 


 Current Medications





Albuterol (Ventolin Hfa 90 Mcg/Actuation (8 G))  2 puff INH Q4H [facility]


   Last Admin: 07/05/18 03:56 Dose:  2 puff


Bupropion HCl (Wellbutrin)  75 mg PO DAILY [facility]


   Last Admin: 07/04/18 10:01 Dose:  75 mg


Dextrose (Dextrose 50% Inj)  0 ml IV STAT PRN; Protocol


   PRN Reason: Hypoglycemia Protocol


   Last Admin: 07/03/18 18:28 Dose:  50 ml


Dextrose (Glutose 15)  0 gm PO ONCE PRN; Protocol


   PRN Reason: Hypoglycemia Protocol


Dolutegravir Sodium (Tivicay)  50 mg PO DAILY [facility]


   PRN Reason: Protocol


   Last Admin: 07/04/18 10:01 Dose:  50 mg


Duloxetine HCl (Cymbalta)  60 mg PO DAILY [facility]


   Last Admin: 07/04/18 10:02 Dose:  60 mg


Emtricitabine/Tenofovir (Truvada 200 Mg-300 Mg)  1 tab PO DAILY [facility]


   PRN Reason: Protocol


   Last Admin: 07/04/18 10:02 Dose:  1 tab


Enalapril Maleate (Vasotec)  10 mg PO DAILY [facility]


   Last Admin: 07/04/18 10:02 Dose:  10 mg


Enoxaparin Sodium (Lovenox)  40 mg SC DAILY [facility]


   PRN Reason: Protocol


   Last Admin: 07/04/18 20:34 Dose:  40 mg


Glucagon (Glucagen Diagnostic Kit)  0 mg IM STAT PRN; Protocol


   PRN Reason: Hypoglycemia Protocol


Dextrose/Sodium Chloride (Dextrose 5%-0.45% Ns 500 Ml)  1,000 mls @ 100 mls/hr 

IV .Q10H [facility]


   Last Admin: 07/05/18 03:58 Dose:  100 mls/hr


Insulin Detemir (Levemir)  30 units SC QAM [facility]


   Last Admin: 07/04/18 11:28 Dose:  30 units


Insulin Human Lispro (Humalog)  0 units SC ACHS [facility]


   PRN Reason: Protocol


   Last Admin: 07/05/18 06:38 Dose:  Not Given


Morphine Sulfate (Morphine)  2 mg IVP Q4 PRN


   PRN Reason: Pain, severe (8-10)


   Last Admin: 07/04/18 05:56 Dose:  2 mg


Ondansetron HCl (Zofran Inj)  4 mg IVP Q6 PRN


   PRN Reason: Nausea/Vomiting


Pantoprazole Sodium (Protonix Ec Tab)  40 mg PO DAILY [facility]


Polyethylene Glycol (Miralax)  17 gm PO BID [facility]











- Labs


Labs: 


 





 07/05/18 05:30 





 07/05/18 05:30 





 











PT  10.9 Seconds (9.8-13.1)   07/03/18  10:17    


 


INR  1.0  (0.9-1.2)   07/03/18  10:17    


 


APTT  32.2 Seconds (25.6-37.1)   07/03/18  10:17    














- Constitutional


Appears: No Acute Distress





- Head Exam


Head Exam: ATRAUMATIC, NORMAL INSPECTION, NORMOCEPHALIC





- Eye Exam


Eye Exam: EOMI, Normal appearance, PERRL


Pupil Exam: NORMAL ACCOMODATION, PERRL





- ENT Exam


ENT Exam: Mucous Membranes Moist, Normal Exam





- Neck Exam


Neck Exam: Full ROM, Normal Inspection.  absent: Lymphadenopathy





- Respiratory Exam


Respiratory Exam: NORMAL BREATHING PATTERN





- Cardiovascular Exam


Cardiovascular Exam: REGULAR RHYTHM, +S1, +S2.  absent: Murmur





- GI/Abdominal Exam


GI & Abdominal Exam: Soft, Normal Bowel Sounds.  absent: Distended, Firm, 

Guarding, Rigid, Tenderness





- Extremities Exam


Extremities Exam: Full ROM, Normal Capillary Refill, Normal Inspection.  absent

: Joint Swelling, Pedal Edema





- Back Exam


Back Exam: NORMAL INSPECTION





- Neurological Exam


Neurological Exam: Alert, Awake, CN II-XII Intact, Normal Gait, Oriented x3





- Psychiatric Exam


Psychiatric exam: Normal Affect, Normal Mood





- Skin


Skin Exam: Dry, Intact, Normal Color, Warm





Assessment and Plan





- Assessment and Plan (Free Text)


Assessment: 


Duodenitis 


CT finding of possible microperf





-Advance diet as tolerated.


-Clear for DC for surgical standpoint


-No surgery indicated at this time: Pain improved. No nausea, vomiting. No free 

air on CT


-GI f/u 





DW Dr. Read